# Patient Record
Sex: FEMALE | HISPANIC OR LATINO | URBAN - METROPOLITAN AREA
[De-identification: names, ages, dates, MRNs, and addresses within clinical notes are randomized per-mention and may not be internally consistent; named-entity substitution may affect disease eponyms.]

---

## 2023-05-04 ENCOUNTER — OFFICE VISIT (OUTPATIENT)
Dept: ORTHOPEDIC SURGERY | Facility: CLINIC | Age: 28
End: 2023-05-04
Payer: COMMERCIAL

## 2023-05-04 VITALS — TEMPERATURE: 97.6 F | HEIGHT: 59 IN | WEIGHT: 125.7 LBS | BODY MASS INDEX: 25.34 KG/M2

## 2023-05-04 DIAGNOSIS — M79.605 PAIN IN BOTH LOWER EXTREMITIES: Primary | ICD-10-CM

## 2023-05-04 DIAGNOSIS — M79.604 PAIN IN BOTH LOWER EXTREMITIES: Primary | ICD-10-CM

## 2023-05-04 PROCEDURE — 99203 OFFICE O/P NEW LOW 30 MIN: CPT | Performed by: ORTHOPAEDIC SURGERY

## 2023-05-04 NOTE — PROGRESS NOTES
General Exam    Patient: Stefani Cabrera    YOB: 1995    Medical Record Number: [id number]    Chief Complaints: Bilateral hip and leg pain    History of Present Illness:     27 y.o. female patient who presents for evaluation treatment bilateral hip and leg pain.  Patient states she had symptoms over the past 9 years.  She is seen several her provider she states her primary doctor had given her some injections about the hip area in the past but she is currently pregnant and her doctor does not want to do that her OB/GYN doctor sent her here for evaluation.  She is 17 weeks pregnant.  She states she had seen a nurse practitioner with [facility] did not do anything she said.  She had an MRI done 2020 which showed some L5-S1 disc desiccation.  Patient reports her symptoms are bilateral and which has some pain in the lateral and sometimes posterior aspect of the low back and buttock and hip area that goes down just past her knee.  Occasionally she has numbness along these areas as well.  States sometimes the pain is so bad she has difficulty walking.  States she has tried therapy for her back in the past.    Denies any numbness or tingling.  Denies any fevers, cough or shortness of breath.    Allergies: No Known Allergies    Home Medications:      Current Outpatient Medications:   •  albuterol sulfate  (90 Base) MCG/ACT inhaler, Inhale 2 puffs into the lungs every 6 (six) hours as needed for Wheezing., Disp: 8.5 g, Rfl: 0  •  norethindrone-ethinyl estradiol (MICROGESTIN 1/20) 1-20 MG-MCG per tablet, Take 1 tablet by mouth daily., Disp: 84 tablet, Rfl: 3  •  amoxicillin (AMOXIL) 500 MG capsule, Take 1 capsule by mouth 2 (two) times daily for 10 days., Disp: 20 capsule, Rfl: 0  •  brompheniramine-pseudoephedrine-DM 30-2-10 MG/5ML syrup, Take 10 mL by mouth 4 (Four) Times a Day As Needed (For cough and congestion) for up to 5 days, Disp: 200 mL, Rfl: 0  •  doxycycline (VIBRAMYICN) 100 MG  "tablet, Take 100 mg by mouth Daily., Disp: , Rfl:   •  methylPREDNISolone (MEDROL) 4 MG dose pack, follow package directions. (Patient not taking: Reported on 5/4/2023), Disp: 21 tablet, Rfl: 0  •  nitrofurantoin, macrocrystal-monohydrate, (MACROBID) 100 MG capsule, Take 1 capsule by mouth 2 (two) times daily for 7 days., Disp: 14 capsule, Rfl: 0    Past Medical History:   Diagnosis Date   • Acne    • CTS (carpal tunnel syndrome)    • Low back strain    • Lumbosacral disc disease        History reviewed. No pertinent surgical history.    Social History     Occupational History   • Not on file   Tobacco Use   • Smoking status: Never   • Smokeless tobacco: Never   Vaping Use   • Vaping Use: Never used   Substance and Sexual Activity   • Alcohol use: Not Currently   • Drug use: Never   • Sexual activity: Yes     Partners: Male     Birth control/protection: None      Social History     Social History Narrative   • Not on file       Family History   Problem Relation Age of Onset   • Diabetes Mother        Review of Systems:      Constitutional: Denies fever, shaking or chills         All other pertinent positives and negatives as noted above in HPI.    Physical Exam: 27 y.o. female    Vitals:    05/04/23 1137   Temp: 97.6 °F (36.4 °C)   TempSrc: Temporal   Weight: 57 kg (125 lb 11.2 oz)   Height: 149.9 cm (59\")       General:  Patient is awake and alert.  Appears in no acute distress or discomfort.      Musculoskeletal/Extremities:    Bilateral lower extremities examined minimal tenderness along the right hip greater trochanter.  Hip range of motion is full and painless.  Negative straight leg raise incisional.  Motor and sensory is intact distally.  Strength appropriate.  Patient ambulates normal gait unassisted.           Assessment: Low back and bilateral leg pain, history of L5-S1 disc  desiccation      Plan:      Discussed findings with the patient.  This may be more back related especially given her history and " past MRI findings.  Do not take any imaging today as she is 17 weeks pregnant.  I did offer therapy but also thought it would be reasonable to get her into see our spine nurse practitioner for further evaluation.  Based on her history and exam findings I really do not feel like it is her hip joint that is the etiology here.  There may be some soft tissue component such as some hip bursitis and IT band tendinitis although symptoms and exam findings are not overwhelming.  Patient like to hold on any therapy until she sees our spine nurse practitioner.           We will plan for follow up as needed.    All questions were answered.  Patient understands and agrees with the plan.    Scott Hanna MD    05/04/2023    CC to Provider, No Known

## 2023-05-09 ENCOUNTER — OFFICE VISIT (OUTPATIENT)
Dept: ORTHOPEDIC SURGERY | Facility: CLINIC | Age: 28
End: 2023-05-09
Payer: COMMERCIAL

## 2023-05-09 VITALS — WEIGHT: 131.4 LBS | HEIGHT: 59 IN | BODY MASS INDEX: 26.49 KG/M2 | TEMPERATURE: 97.4 F

## 2023-05-09 DIAGNOSIS — M46.1 SACROILIAC INFLAMMATION: Primary | ICD-10-CM

## 2023-05-09 DIAGNOSIS — M54.16 LUMBAR RADICULOPATHY: ICD-10-CM

## 2023-05-09 NOTE — PROGRESS NOTES
Patient Name: Stefani Cabrera   YOB: 1995  Referring Primary Care Physician: Provider, No Known      Chief Complaint:    Chief Complaint   Patient presents with   • Lumbar Spine - Initial Evaluation, Pain        HPI:  Stefani Cabrera is a 27 y.o. female who presents to [facility] for evaluation of low back pain referring into the left buttock and down the posterolateral aspect of the left lower extremity.  She denies recent injury.  She relates the symptoms to working since she was 18 years old.  She is also currently pregnant and although states she has had intermittent symptoms through the years and even when she was last pregnant, this time it is more persistent and life limiting.  Pain is aggravated by lifting, prolonged sitting or increased activity and alleviated by rest, although lying on the left side contributes to left hip pain.  She was previously treated with therapy and chiropractic treatment at [facility] where she also had lumbar MRI per her report.  She has also been evaluated at [facility] for similar complaints.  This is a new patient to the practice and new to me.  Prior pertinent records were reviewed.    PFSH:  See attached    ROS: As per HPI, otherwise negative    Objective:      27 y.o. female  Body mass index is 26.54 kg/m²., 59.6 kg (131 lb 6.4 oz), @HT@  Vitals:    05/09/23 0934   Temp: 97.4 °F (36.3 °C)         Neurologic Exam     Gait, Coordination, and Reflexes     Gait  Gait: normal    Reflexes   Right achilles: 2/4  Left achilles: 2/4     Ortho Exam    Spine Musculoskeletal Exam    Gait    Gait is normal.    Palpation    Thoracolumbar    Tenderness: present      Paraspinous: right and left      SI Joint: left    Right      Muscle tone: normal    Left      Muscle tone: normal    Strength    Thoracolumbar    Thoracolumbar motor exam is normal.     Sensory    Thoracolumbar    Thoracolumbar sensation is normal.    Reflexes    Right       Quadriceps: 2/4      Achilles: 2/4     Left      Quadriceps: 2/4      Achilles: 2/4    Special Tests    Thoracolumbar      Right      SLR: no back or leg pain      Left      SLR: no back or leg pain    General      Constitutional: well-developed and well-nourished    Scleral icterus: no    Labored breathing: no    Psychiatric: normal mood and affect and no acute distress    Neurological: alert and oriented x3    Skin: intact        IMAGING:       No imaging in office today, patient is pregnant    Assessment:           Diagnoses and all orders for this visit:    1. Sacroiliac inflammation (Primary)  -     Ambulatory Referral to Physical Therapy Evaluate and treat    2. Lumbar radiculopathy  -     Ambulatory Referral to Physical Therapy Evaluate and treat             Plan:  We had a lengthy discussion today regarding treatment options while she is pregnant.  Generally the only course of treatment is physical therapy.  She has been to therapy in the past, but that was over 2 years ago.  We will send referral to therapy to treat the low back and SI joint.  She does have pain upon palpation of the left SI joint.  It is fairly common in pregnancy to have SI joint dysfunction primarily related to hormonal changes and ligament laxity.  She states that her OB, Dr Paulina Tena advised that she could have injections if they were offered.  I did reach out [facility] and they do not have the ability to perform injections during pregnancy.  We will reach out to the hospital pain management to see if they are able if ok with Dr Paulina Walker.  Patient also states that she may need FMLA.  She was advised to obtain FMLA papers and fax them to the office, but this may be better coming from her OB/GYN although would be willing to give her intermittent FMLA through her pregnancy.  She understands that if symptoms persist past delivery, she can follow-up and we will get lumbar MRI at  that point.  She states she has been told in the past by the chiropractor that she had a herniated disc.  There was documentation from [facility] that they were going to obtain her lumbar MRI results and make plans afterwards, but it appears she may have been lost to follow-up.    Of note: I have left a message at the hospital pain management to see if they are willing to do SI joint injection without x-ray guidance.  I did confirm with ALONDRA Webster that a lidocaine/steroid injection into the SI joint would be acceptable to Dr. Mallika Stephen if symptoms worsen or fail to improve.     Addendum 5/10/2023: Spoke to Judie in pain management and they are willing to do the SI joint injection under ultrasound.  She was given the nurse practitioner and OB/GYN names for further contact.  We will place order for SI joint injection under ultrasound and pain management will call the patient.    EMR Dragon/Transcription Disclaimer:   Much of this encounter note is an electronic transcription/translation of spoken language to printed text. The electronic translation of spoken language may permit erroneous, or at times, nonsensical words or phrases to be inadvertently transcribed; Although I have reviewed the note for such errors, some may still exist.

## 2023-05-11 ENCOUNTER — DOCUMENTATION (OUTPATIENT)
Dept: PAIN MEDICINE | Facility: HOSPITAL | Age: 28
End: 2023-05-11
Payer: COMMERCIAL

## 2023-05-12 NOTE — PROGRESS NOTES
S/w pt via phone call and informed SI injection could be performed without fluoroscopy. Procedure explained, pt verbalizes understanding but concerned about potential risks of steroid injection and states she would like to follow up with Ti[facility] CANTU and/or Dr. Walker prior to scheduling. I gave pt our direct number for scheduling if needed.

## 2023-05-17 ENCOUNTER — TELEPHONE (OUTPATIENT)
Dept: ORTHOPEDIC SURGERY | Facility: CLINIC | Age: 28
End: 2023-05-17
Payer: COMMERCIAL

## 2023-05-26 ENCOUNTER — HOSPITAL ENCOUNTER (OUTPATIENT)
Dept: PAIN MEDICINE | Facility: HOSPITAL | Age: 28
Discharge: HOME | End: 2023-05-26
Payer: COMMERCIAL

## 2023-05-26 ENCOUNTER — ANESTHESIA EVENT (OUTPATIENT)
Dept: PAIN MEDICINE | Facility: HOSPITAL | Age: 28
End: 2023-05-26
Payer: COMMERCIAL

## 2023-05-26 ENCOUNTER — ANESTHESIA (OUTPATIENT)
Dept: PAIN MEDICINE | Facility: HOSPITAL | Age: 28
End: 2023-05-26
Payer: COMMERCIAL

## 2023-05-26 VITALS
HEART RATE: 77 BPM | DIASTOLIC BLOOD PRESSURE: 67 MMHG | OXYGEN SATURATION: 99 % | BODY MASS INDEX: 26.41 KG/M2 | HEIGHT: 59 IN | RESPIRATION RATE: 14 BRPM | WEIGHT: 131 LBS | TEMPERATURE: 98.6 F | SYSTOLIC BLOOD PRESSURE: 101 MMHG

## 2023-05-26 DIAGNOSIS — M46.1 SACROILIAC INFLAMMATION: ICD-10-CM

## 2023-05-26 PROCEDURE — 25010000002 METHYLPREDNISOLONE PER 80 MG: Performed by: ANESTHESIOLOGY

## 2023-05-26 RX ORDER — SODIUM CHLORIDE 0.9 % (FLUSH) 0.9 %
3 SYRINGE (ML) INJECTION EVERY 12 HOURS SCHEDULED
Status: DISCONTINUED | OUTPATIENT
Start: 2023-05-26 | End: 2023-05-27 | Stop reason: HOSPADM

## 2023-05-26 RX ORDER — BUPIVACAINE HYDROCHLORIDE 2.5 MG/ML
10 INJECTION, SOLUTION EPIDURAL; INFILTRATION; INTRACAUDAL ONCE
Status: COMPLETED | OUTPATIENT
Start: 2023-05-26 | End: 2023-05-26

## 2023-05-26 RX ORDER — LIDOCAINE HYDROCHLORIDE 10 MG/ML
1 INJECTION, SOLUTION INFILTRATION; PERINEURAL ONCE
Status: DISCONTINUED | OUTPATIENT
Start: 2023-05-26 | End: 2023-05-27 | Stop reason: HOSPADM

## 2023-05-26 RX ORDER — SODIUM CHLORIDE 0.9 % (FLUSH) 0.9 %
10 SYRINGE (ML) INJECTION AS NEEDED
Status: DISCONTINUED | OUTPATIENT
Start: 2023-05-26 | End: 2023-05-27 | Stop reason: HOSPADM

## 2023-05-26 RX ORDER — METHYLPREDNISOLONE ACETATE 80 MG/ML
80 INJECTION, SUSPENSION INTRA-ARTICULAR; INTRALESIONAL; INTRAMUSCULAR; SOFT TISSUE ONCE
Status: COMPLETED | OUTPATIENT
Start: 2023-05-26 | End: 2023-05-26

## 2023-05-26 RX ADMIN — METHYLPREDNISOLONE ACETATE 80 MG: 80 INJECTION, SUSPENSION INTRA-ARTICULAR; INTRALESIONAL; INTRAMUSCULAR; SOFT TISSUE at 09:03

## 2023-05-26 RX ADMIN — BUPIVACAINE HYDROCHLORIDE 10 ML: 2.5 INJECTION, SOLUTION EPIDURAL; INFILTRATION; INTRACAUDAL; PERINEURAL at 09:03

## 2023-05-26 NOTE — H&P
CHIEF COMPLAINT: Left back pain      HISTORY OF PRESENT ILLNESS:  She is pregnant and complaining of pain over her left SI joint.  This is worse with activity and exacerbated during pregnancy.  Sharp aching in nature.  5 out of 10.  Conservative therapy has included rest.  PAST MEDICAL HISTORY:  Current Outpatient Medications on File Prior to Encounter   Medication Sig Dispense Refill   • albuterol sulfate  (90 Base) MCG/ACT inhaler Inhale 2 puffs into the lungs every 6 (six) hours as needed for Wheezing. 8.5 g 0   • amoxicillin (AMOXIL) 500 MG capsule Take 1 capsule by mouth 2 (two) times daily for 10 days. 20 capsule 0   • brompheniramine-pseudoephedrine-DM 30-2-10 MG/5ML syrup Take 10 mL by mouth 4 (Four) Times a Day As Needed (For cough and congestion) for up to 5 days 200 mL 0   • doxycycline (VIBRAMYICN) 100 MG tablet Take 100 mg by mouth Daily.     • methylPREDNISolone (MEDROL) 4 MG dose pack follow package directions. (Patient not taking: Reported on 5/4/2023) 21 tablet 0   • nitrofurantoin, macrocrystal-monohydrate, (MACROBID) 100 MG capsule Take 1 capsule by mouth 2 (two) times daily for 7 days. 14 capsule 0   • norethindrone-ethinyl estradiol (MICROGESTIN 1/20) 1-20 MG-MCG per tablet Take 1 tablet by mouth daily. (Patient not taking: Reported on 5/9/2023) 84 tablet 3     No current facility-administered medications on file prior to encounter.       Past Medical History:   Diagnosis Date   • Acne    • CTS (carpal tunnel syndrome)    • Low back strain    • Lumbosacral disc disease          SOCIAL HISTORY:  No tobacco    REVIEW OF SYSTEMS:  No hematologic infectious or constitutional symptoms  Other review of systems non-contributory  Negative screen for AKANKSHA      PHYSICAL EXAM:  There were no vitals taken for this visit.  Well-developed well-nourished no acute distress  Extra ocular movements intact  Mallampati class 2 airway  Alert and oriented ×3  Positive Thigh Thrust test  Positive ANAI  test  Positive Gaenslen test      DIAGNOSIS:  Post-Op Diagnosis Codes:     * Sacroiliitis [M46.1]    PLAN:  Left Sacroiliac joint injection with steroid and local anesthetic.  Discussed the risks with the patient including failure of relief and infection.  Also discussed conservative therapy consisting of ice, Tylenol, NSAIDs if tolerated, and physical therapy

## 2023-05-26 NOTE — ANESTHESIA PROCEDURE NOTES
PAIN SI joint injection      Patient reassessed immediately prior to procedure    Patient location during procedure: [facility]    Reason for block: procedure for pain  Performed by  Anesthesiologist: Scott Ayala MD  Preanesthetic Checklist  Completed: patient identified and surgical consent  Prep:  Patient position: prone  Sterile barriers:cap, gloves, mask and sterile barrier  Prep: ChloraPrep  Patient monitoring: blood pressure monitoring, continuous pulse oximetry and EKG  Procedure:  Sedation:no  Guidance:Ultrasound  Contrast Medium:no  Location:Bilateral  Needle Type:Quincke  Needle Gauge:22 G  Medications:  Depomedrol:80 mg  Comment:Bupivacaine 0.25% with 2cc each side    Post Assessment  Patient Tolerance:comfortable throughout block  Complications:no  Additional Notes  Diagnosis: Sacroiliitis    Sedation:  none    Sedation time:    Under ultrasound guidance the SI joint was identified on each side the needle was seen in proximity to the joint and medications seen dispersing near their.  No abnormalities noted

## 2023-08-30 ENCOUNTER — TRANSCRIBE ORDERS (OUTPATIENT)
Dept: DIABETES SERVICES | Facility: HOSPITAL | Age: 28
End: 2023-08-30
Payer: COMMERCIAL

## 2023-08-30 DIAGNOSIS — O24.410 DIET CONTROLLED GESTATIONAL DIABETES MELLITUS (GDM) IN THIRD TRIMESTER: Primary | ICD-10-CM

## 2023-09-01 ENCOUNTER — HOSPITAL ENCOUNTER (OUTPATIENT)
Dept: DIABETES SERVICES | Facility: HOSPITAL | Age: 28
Discharge: HOME | End: 2023-09-01
Payer: COMMERCIAL

## 2023-09-01 PROCEDURE — G0108 DIAB MANAGE TRN  PER INDIV: HCPCS

## 2023-09-01 NOTE — NURSING NOTE
[facility]   Diabetes Management       Date:  2023    Patient:  Stefani Cabrera     MRN:  [id number]    Gestational age:  34w5d       Diagnosis:  Gestational Diabetes A1    Management Role:  Phase I:  Counseling and Education     Insulin dosing:   N/A- Pt to start BG checks.     Summary   The patient was seen for gestational diabetes education. Pt reports her mother has DM. Pt reports she is ~35 wks. She reports pp BGs of 143 and 180 thus far. Instruct pt when to check BGs.   Meal planning discussed. Discuss risk for LGA infant with high BGs. Patient verbalizes understanding. Review BG meter use.   Patient has been encouraged to call our office with questions or additional education needs. Patient has been given a blood glucose log sheet and verbalizes understanding of use. Pt reports a scheduled OB f/u the . Advise pt to call OB after the holiday weekend as pt states she was told she needed an iron infusion. Pt v.u.     Referring provider:  Tri Marie Md  [address] [address]      Patrizia Muniz RN, BSN, [facility] [place]  2023  16:36 EDT

## 2023-09-05 ENCOUNTER — TRANSCRIBE ORDERS (OUTPATIENT)
Dept: ADMINISTRATIVE | Facility: HOSPITAL | Age: 28
End: 2023-09-05
Payer: COMMERCIAL

## 2023-09-05 DIAGNOSIS — O99.019 ANEMIA IN PREGNANCY, UNSPECIFIED TRIMESTER: Primary | ICD-10-CM

## 2023-09-06 ENCOUNTER — TELEPHONE (OUTPATIENT)
Dept: DIABETES SERVICES | Facility: HOSPITAL | Age: 28
End: 2023-09-06
Payer: COMMERCIAL

## 2023-09-11 PROBLEM — O99.019 ANEMIA IN PREGNANCY: Status: ACTIVE | Noted: 2023-09-11

## 2023-09-13 ENCOUNTER — HOSPITAL ENCOUNTER (OUTPATIENT)
Dept: INFUSION THERAPY | Facility: HOSPITAL | Age: 28
Discharge: HOME | End: 2023-09-13
Payer: COMMERCIAL